# Patient Record
(demographics unavailable — no encounter records)

---

## 2025-06-12 NOTE — ADDENDUM
[FreeTextEntry1] : This note was written by Denisa Quiñones on 06/12/2025 acting solely as a scribe for Dr. Romie Adam.   All medical record entries made by the Scribe were at my, Dr. Romie Adam, direction and personally dictated by me on 06/12/2025. I have personally reviewed the chart and agree that the record accurately reflects my personal performance of the history, physical exam, assessment and plan.

## 2025-06-12 NOTE — PHYSICAL EXAM
[de-identified] : Left knee exam  Skin: Clean, dry, intact Inspection: No obvious malalignment, no masses, no swelling, no effusion Pulses: 2+ DP/PT pulses ROM: 0-120 degrees of flexion. + pain with deep knee flexion/extension. Tenderness: + MJLT. No LJLT. No pain over the patella facets. No pain to the quadriceps tendon. No pain to the patella tendon. No posterior knee tenderness. Stability: Stable to varus, valgus. Negative lachman testing. Negative anterior drawer, negative posterior drawer. Strength: 5/5 Q/H/TA/GS/EHL, without atrophy Neuro: In tact to light touch throughout, DTR's normal Additional tests: Negative McMurrays test, Negative patellar grind test.  [de-identified] : The following radiographs were ordered and read by me during this patients visit. I reviewed each radiograph in detail with the patient and discussed the findings as highlighted below.   4 views of the left knee were obtained, 06/12/2025, that show no acute fracture or dislocation. There is moderate medial, minimal lateral and mild patellofemoral degenerative changes seen. There is no significant malalignment. Osteophytes tibial tubercle. Patella enthesopathy.

## 2025-06-12 NOTE — PHYSICAL EXAM
[de-identified] : Left knee exam  Skin: Clean, dry, intact Inspection: No obvious malalignment, no masses, no swelling, no effusion Pulses: 2+ DP/PT pulses ROM: 0-120 degrees of flexion. + pain with deep knee flexion/extension. Tenderness: + MJLT. No LJLT. No pain over the patella facets. No pain to the quadriceps tendon. No pain to the patella tendon. No posterior knee tenderness. Stability: Stable to varus, valgus. Negative lachman testing. Negative anterior drawer, negative posterior drawer. Strength: 5/5 Q/H/TA/GS/EHL, without atrophy Neuro: In tact to light touch throughout, DTR's normal Additional tests: Negative McMurrays test, Negative patellar grind test.  [de-identified] : The following radiographs were ordered and read by me during this patients visit. I reviewed each radiograph in detail with the patient and discussed the findings as highlighted below.   4 views of the left knee were obtained, 06/12/2025, that show no acute fracture or dislocation. There is moderate medial, minimal lateral and mild patellofemoral degenerative changes seen. There is no significant malalignment. Osteophytes tibial tubercle. Patella enthesopathy.

## 2025-06-12 NOTE — DISCUSSION/SUMMARY
[de-identified] : 49 y/o female with left knee pain.  Patient presents with knee pain consistent with an acute exacerbation of chronic degenerative arthrosis as seen on radiographs. Radiographs show moderate medial, minimal lateral and mild patellofemoral degenerative changes. I discussed the scope of degenerative arthritis with the patient at length today, as well as likely future progression of the disease. Current discomfort may be related to the bony degenerative changes seen on XR imaging, or the associated degeneration to the soft tissues within the knee joint including cartilage, meniscus, or ligamentous structures.  Discussed treatment for acute "arthritic flares" is typically through symptomatic inflammatory control and supportive care. First line treatment includes rest/activity modification, anti-inflammatory medications (NSAIDs) vs. OTC Tylenol, ice, physical therapy and use of cortisone injection. We also briefly discussed definitive surgical options with future total joint arthroplasty if symptoms persist and cause prolonged disability or interfere with activities of daily living.  Recommendations: Supportive care as detailed above.  Injection therapy was provided for therapeutic and symptomatic relief.  Follow up as needed.

## 2025-06-12 NOTE — DISCUSSION/SUMMARY
[de-identified] : 51 y/o female with left knee pain.  Patient presents with knee pain consistent with an acute exacerbation of chronic degenerative arthrosis as seen on radiographs. Radiographs show moderate medial, minimal lateral and mild patellofemoral degenerative changes. I discussed the scope of degenerative arthritis with the patient at length today, as well as likely future progression of the disease. Current discomfort may be related to the bony degenerative changes seen on XR imaging, or the associated degeneration to the soft tissues within the knee joint including cartilage, meniscus, or ligamentous structures.  Discussed treatment for acute "arthritic flares" is typically through symptomatic inflammatory control and supportive care. First line treatment includes rest/activity modification, anti-inflammatory medications (NSAIDs) vs. OTC Tylenol, ice, physical therapy and use of cortisone injection. We also briefly discussed definitive surgical options with future total joint arthroplasty if symptoms persist and cause prolonged disability or interfere with activities of daily living.  Recommendations: Supportive care as detailed above.  Injection therapy was provided for therapeutic and symptomatic relief.  Follow up as needed.

## 2025-06-12 NOTE — HISTORY OF PRESENT ILLNESS
[de-identified] : 50-year-old female presents today with left knee pain x 1 month. No injury or trauma reports. She localized the pain primarily in the posterior aspect. The pain is constant but exacerbated by walking, standing from a seated position, and kneeling. No history of injury or previous surgery. Patient reports no relief from Motrin/Tylenol. She also c/o buckling sensation when descending downstairs and occasional swelling. Patient is a diabetic.  The patient's past medical history, past surgical history, medications and allergies were reviewed by me today with the patient and documented accordingly. In addition, the patient's family and social history, which were noncontributory to this visit were reviewed also.

## 2025-06-12 NOTE — PROCEDURE
Preceptor Attestation:   Patient seen, evaluated and discussed with the resident. I have verified the content of the note, which accurately reflects my assessment of the patient and the plan of care.  Supervising Physician:Rayray Duggan MD  [facility]             [de-identified] : Injection: Left knee joint.  Indication: OA    A discussion was had with the patient regarding this procedure and all questions were answered. All risks, benefits and alternatives were discussed. These included but were not limited to bleeding, infection, and allergic reaction. Alcohol was used to clean the skin, and betadine was used to sterilize and prep the area in the supero-lateral aspect of the left knee. Ethyl chloride spray was then used as a topical anesthetic. A 21-gauge needle was used to inject 4cc of 1% lidocaine and 1cc of 40mg/ml methylprednisolone into the knee. A sterile bandage was then applied. The patient tolerated the procedure well and there were no complications.

## 2025-06-12 NOTE — HISTORY OF PRESENT ILLNESS
[de-identified] : 50-year-old female presents today with left knee pain x 1 month. No injury or trauma reports. She localized the pain primarily in the posterior aspect. The pain is constant but exacerbated by walking, standing from a seated position, and kneeling. No history of injury or previous surgery. Patient reports no relief from Motrin/Tylenol. She also c/o buckling sensation when descending downstairs and occasional swelling. Patient is a diabetic.  The patient's past medical history, past surgical history, medications and allergies were reviewed by me today with the patient and documented accordingly. In addition, the patient's family and social history, which were noncontributory to this visit were reviewed also.

## 2025-06-12 NOTE — PROCEDURE
[de-identified] : Injection: Left knee joint.  Indication: OA    A discussion was had with the patient regarding this procedure and all questions were answered. All risks, benefits and alternatives were discussed. These included but were not limited to bleeding, infection, and allergic reaction. Alcohol was used to clean the skin, and betadine was used to sterilize and prep the area in the supero-lateral aspect of the left knee. Ethyl chloride spray was then used as a topical anesthetic. A 21-gauge needle was used to inject 4cc of 1% lidocaine and 1cc of 40mg/ml methylprednisolone into the knee. A sterile bandage was then applied. The patient tolerated the procedure well and there were no complications.